# Patient Record
Sex: FEMALE | Race: WHITE | NOT HISPANIC OR LATINO | Employment: FULL TIME | URBAN - METROPOLITAN AREA
[De-identification: names, ages, dates, MRNs, and addresses within clinical notes are randomized per-mention and may not be internally consistent; named-entity substitution may affect disease eponyms.]

---

## 2024-06-03 ENCOUNTER — OFFICE VISIT (OUTPATIENT)
Dept: URGENT CARE | Facility: CLINIC | Age: 64
End: 2024-06-03
Payer: MEDICARE

## 2024-06-03 VITALS
DIASTOLIC BLOOD PRESSURE: 80 MMHG | TEMPERATURE: 98 F | SYSTOLIC BLOOD PRESSURE: 117 MMHG | RESPIRATION RATE: 14 BRPM | HEART RATE: 82 BPM | OXYGEN SATURATION: 97 %

## 2024-06-03 DIAGNOSIS — R05.1 ACUTE COUGH: ICD-10-CM

## 2024-06-03 DIAGNOSIS — J01.40 ACUTE NON-RECURRENT PANSINUSITIS: Primary | ICD-10-CM

## 2024-06-03 LAB — POC SARS-COV-2 AG: NORMAL

## 2024-06-03 PROCEDURE — 1159F MED LIST DOCD IN RCRD: CPT | Performed by: PHYSICIAN ASSISTANT

## 2024-06-03 PROCEDURE — 87426 SARSCOV CORONAVIRUS AG IA: CPT | Performed by: PHYSICIAN ASSISTANT

## 2024-06-03 PROCEDURE — 99213 OFFICE O/P EST LOW 20 MIN: CPT | Performed by: PHYSICIAN ASSISTANT

## 2024-06-03 RX ORDER — DOXYCYCLINE 100 MG/1
100 CAPSULE ORAL 2 TIMES DAILY
Qty: 20 CAPSULE | Refills: 0 | Status: SHIPPED | OUTPATIENT
Start: 2024-06-03 | End: 2024-06-13

## 2024-06-03 RX ORDER — MOMETASONE FUROATE AND FORMOTEROL FUMARATE DIHYDRATE 100; 5 UG/1; UG/1
2 AEROSOL RESPIRATORY (INHALATION)
COMMUNITY

## 2024-06-03 RX ORDER — CITALOPRAM 20 MG/1
20 TABLET, FILM COATED ORAL
COMMUNITY

## 2024-06-03 RX ORDER — OMEPRAZOLE 40 MG/1
40 CAPSULE, DELAYED RELEASE ORAL
COMMUNITY

## 2024-06-03 NOTE — PROGRESS NOTES
Subjective   Patient ID: Silvana Jerez is a 64 y.o. female who presents for Cough (Productive cough x6 days. +Exposure to croup) and Headache.  Patient is here with cough productive of purulent sputum sinus pain and pressure ear pressure headache over the course of the last 6 days.  She notes that initially symptoms did not seem to be particularly bothersome but they consistently worsened over the course of the last week.  She denies any chest pain or shortness of breath no nausea vomiting diarrhea or abdominal pain she denies any fevers or chills    No past medical history on file.      The remainder of the systems were reviewed and are negative unless noted above      Objective   /80   Pulse 82   Temp 36.7 °C (98 °F)   Resp 14   SpO2 97%   Physical Exam  Vitals reviewed.   Constitutional:       General: She is not in acute distress.     Appearance: Normal appearance. She is not toxic-appearing.   HENT:      Head: Normocephalic.      Right Ear: Tympanic membrane and ear canal normal. No tenderness. No mastoid tenderness.      Left Ear: Tympanic membrane and ear canal normal. No tenderness. No mastoid tenderness.      Nose: Congestion present. No rhinorrhea.      Comments: Tenderness to the frontal and maxillary sinuses bilaterally     Mouth/Throat:      Mouth: Mucous membranes are moist.      Pharynx: Oropharynx is clear. No posterior oropharyngeal erythema.   Eyes:      Conjunctiva/sclera: Conjunctivae normal.      Pupils: Pupils are equal, round, and reactive to light.   Cardiovascular:      Rate and Rhythm: Normal rate and regular rhythm.      Heart sounds: No murmur heard.  Pulmonary:      Effort: No respiratory distress.      Breath sounds: No stridor. No wheezing, rhonchi or rales.   Chest:      Chest wall: No tenderness.   Abdominal:      Tenderness: There is no abdominal tenderness. There is no guarding.   Musculoskeletal:         General: Normal range of motion.   Skin:     General: Skin is warm  and dry.      Capillary Refill: Capillary refill takes less than 2 seconds.      Findings: No erythema.   Neurological:      General: No focal deficit present.      Mental Status: She is alert.         Assessment/Plan   Problem List Items Addressed This Visit       Acute cough    Relevant Orders    POCT BD Veritor COVID-19 AG    Acute non-recurrent pansinusitis - Primary    Relevant Medications    doxycycline (Monodox) 100 mg capsule      Patient here with worsening sinus pain and pressure, productive cough over the course of last 6 days.  Symptoms have gradually been getting worse over the course of the week and given the scenario I have opted to treat for acute bacterial rhinosinusitis.  Treating with doxycycline over-the-counter  twice daily for the next 10 days recommending Flonase and Delsym

## 2024-06-03 NOTE — PATIENT INSTRUCTIONS
Assessment/Plan   Problem List Items Addressed This Visit       Acute cough    Relevant Orders    POCT BD Veritor COVID-19 AG    Acute non-recurrent pansinusitis - Primary    Relevant Medications    doxycycline (Monodox) 100 mg capsule      Patient here with worsening sinus pain and pressure, productive cough over the course of last 6 days.  Symptoms have gradually been getting worse over the course of the week and given the scenario I have opted to treat for acute bacterial rhinosinusitis.  Treating with doxycycline over-the-counter  twice daily for the next 10 days recommending Flonase and Delsym